# Patient Record
Sex: FEMALE | Race: WHITE | URBAN - METROPOLITAN AREA
[De-identification: names, ages, dates, MRNs, and addresses within clinical notes are randomized per-mention and may not be internally consistent; named-entity substitution may affect disease eponyms.]

---

## 2022-07-06 ENCOUNTER — OFFICE VISIT (OUTPATIENT)
Dept: URBAN - METROPOLITAN AREA CLINIC 111 | Facility: CLINIC | Age: 67
End: 2022-07-06
Payer: MEDICARE

## 2022-07-06 VITALS
TEMPERATURE: 98.1 F | HEART RATE: 71 BPM | HEIGHT: 60 IN | SYSTOLIC BLOOD PRESSURE: 153 MMHG | WEIGHT: 112.6 LBS | DIASTOLIC BLOOD PRESSURE: 77 MMHG | BODY MASS INDEX: 22.1 KG/M2

## 2022-07-06 DIAGNOSIS — F41.9 ANXIETY: ICD-10-CM

## 2022-07-06 DIAGNOSIS — K21.9 GASTROESOPHAGEAL REFLUX DISEASE, UNSPECIFIED WHETHER ESOPHAGITIS PRESENT: ICD-10-CM

## 2022-07-06 PROCEDURE — 99203 OFFICE O/P NEW LOW 30 MIN: CPT | Performed by: NURSE PRACTITIONER

## 2022-07-06 RX ORDER — ROSUVASTATIN CALCIUM 5 MG/1
1 TABLET TABLET, FILM COATED ORAL ONCE A DAY
Status: ACTIVE | COMMUNITY

## 2022-07-06 NOTE — HPI-TODAY'S VISIT:
65 yo female presents for acid reflux symptoms. Reports having occasional ingestion, belching, and heart burn for last one month. Few of her friends passed away recently and anxiety has increased. Takes Mylanta with some relief. Reports having similar symptoms in 2018, had egd/colon at [facility] was nml at that time, h pylori and swallowing test were nml per patient. Denies fever, chills, abd pain, nausea, vomiting, dysphagia, melena, hematochezia or weight loss. Denies fh gi malignancy.

## 2022-07-13 ENCOUNTER — OFFICE VISIT (OUTPATIENT)
Dept: URBAN - METROPOLITAN AREA CLINIC 111 | Facility: CLINIC | Age: 67
End: 2022-07-13

## 2022-07-13 ENCOUNTER — OFFICE VISIT (OUTPATIENT)
Dept: URBAN - METROPOLITAN AREA CLINIC 111 | Facility: CLINIC | Age: 67
End: 2022-07-13
Payer: MEDICARE

## 2022-07-13 VITALS
TEMPERATURE: 97.9 F | HEART RATE: 83 BPM | DIASTOLIC BLOOD PRESSURE: 83 MMHG | BODY MASS INDEX: 22.19 KG/M2 | WEIGHT: 113 LBS | SYSTOLIC BLOOD PRESSURE: 134 MMHG | HEIGHT: 60 IN

## 2022-07-13 DIAGNOSIS — F41.9 ANXIETY: ICD-10-CM

## 2022-07-13 DIAGNOSIS — K21.9 GASTROESOPHAGEAL REFLUX DISEASE, UNSPECIFIED WHETHER ESOPHAGITIS PRESENT: ICD-10-CM

## 2022-07-13 PROCEDURE — 99213 OFFICE O/P EST LOW 20 MIN: CPT | Performed by: NURSE PRACTITIONER

## 2022-07-13 RX ORDER — ROSUVASTATIN CALCIUM 5 MG/1
1 TABLET TABLET, FILM COATED ORAL ONCE A DAY
Status: DISCONTINUED | COMMUNITY

## 2022-07-13 NOTE — HPI-TODAY'S VISIT:
67 yo female presents for  following up. Patient was seen by me on 7/6/22 for acid reflux symptoms for about one month. Reports feeling better after starting otc prevacid. Reports sensation in her throat even after pills go down. She doesn't have sensation with liquid or gummy medications. Denies dysphagia or odynaphagia. Admits having high anxiety and stress, unable to sleep at night. Has no appetite. She was 115 lb last month,113 lb today. She also takes mylanta prn with relief of  occasional ingestion, belching, and heart burn. She is afraid something bad will happen to her. Reports having similar symptoms in 2018, had egd/colon at [facility] was nml at that time, h pylori and swallowing test were nml per patient. Denies fever, chills, abd pain, nausea, vomiting, melena or hematochezia. Denies fh gi malignancy.

## 2022-07-25 ENCOUNTER — TELEPHONE ENCOUNTER (OUTPATIENT)
Dept: URBAN - METROPOLITAN AREA CLINIC 111 | Facility: CLINIC | Age: 67
End: 2022-07-25

## 2022-08-02 ENCOUNTER — OFFICE VISIT (OUTPATIENT)
Dept: URBAN - METROPOLITAN AREA CLINIC 98 | Facility: CLINIC | Age: 67
End: 2022-08-02

## 2022-08-15 ENCOUNTER — OFFICE VISIT (OUTPATIENT)
Dept: URBAN - METROPOLITAN AREA CLINIC 111 | Facility: CLINIC | Age: 67
End: 2022-08-15

## 2022-09-20 ENCOUNTER — OFFICE VISIT (OUTPATIENT)
Dept: URBAN - METROPOLITAN AREA CLINIC 25 | Facility: CLINIC | Age: 67
End: 2022-09-20
Payer: MEDICARE

## 2022-09-20 ENCOUNTER — WEB ENCOUNTER (OUTPATIENT)
Dept: URBAN - METROPOLITAN AREA CLINIC 25 | Facility: CLINIC | Age: 67
End: 2022-09-20

## 2022-09-20 ENCOUNTER — DASHBOARD ENCOUNTERS (OUTPATIENT)
Age: 67
End: 2022-09-20

## 2022-09-20 VITALS
TEMPERATURE: 97.8 F | HEART RATE: 111 BPM | SYSTOLIC BLOOD PRESSURE: 167 MMHG | BODY MASS INDEX: 22.34 KG/M2 | HEIGHT: 60 IN | DIASTOLIC BLOOD PRESSURE: 93 MMHG | WEIGHT: 113.8 LBS

## 2022-09-20 DIAGNOSIS — R13.19 ESOPHAGEAL DYSPHAGIA: ICD-10-CM

## 2022-09-20 DIAGNOSIS — K21.9 GASTROESOPHAGEAL REFLUX DISEASE, UNSPECIFIED WHETHER ESOPHAGITIS PRESENT: ICD-10-CM

## 2022-09-20 DIAGNOSIS — F41.9 ANXIETY: ICD-10-CM

## 2022-09-20 PROCEDURE — 99213 OFFICE O/P EST LOW 20 MIN: CPT | Performed by: INTERNAL MEDICINE

## 2022-09-20 RX ORDER — CLOMIPRAMINE HYDROCHLORIDE 25 MG/1
1 CAPSULE CAPSULE ORAL ONCE A DAY
Qty: 30 | Status: ACTIVE | COMMUNITY
Start: 2022-09-20

## 2022-09-20 RX ORDER — CLONAZEPAM 1 MG/1
TABLET ORAL
Qty: 32 TABLET | Status: ACTIVE | COMMUNITY

## 2022-09-20 NOTE — HPI-TODAY'S VISIT:
Patient seen by NP in [place] office on July.  In June she developed GERD and dysphagia to solids.  She had an EGD the end of July whoch had a small HH and mild gastrittis.  Barium Swallow last wek was normal exept for the small HH.  She did not improve with PPI.  She has started to be seen by Psych and started on Clonazepam and something for OCD.  Overall she is feeling better.  She denies heartburn.  She denies N/V.  She only as mild regurgitation.  She no longer has the dysphagia.  Her appetite is now improved and she ahs regained some of her weight.  She denies abdominal pain.  She denies LGI symptoms.  She denies LGI bleed or melena.  Per the aptient she had a normal colonoscopy in 2018 at [facility].  She ahd similar symptoms in 2018 with a full w/u as well.  SHe does not know the path results from her recent EGD.  She ahs not had prior Eso [facility]

## 2022-09-30 PROBLEM — 48694002: Status: ACTIVE | Noted: 2022-07-06

## 2022-09-30 PROBLEM — 235595009: Status: ACTIVE | Noted: 2022-07-06

## 2023-03-07 ENCOUNTER — OFFICE VISIT (OUTPATIENT)
Dept: URBAN - METROPOLITAN AREA CLINIC 25 | Facility: CLINIC | Age: 68
End: 2023-03-07